# Patient Record
Sex: FEMALE | Race: OTHER | HISPANIC OR LATINO | URBAN - METROPOLITAN AREA
[De-identification: names, ages, dates, MRNs, and addresses within clinical notes are randomized per-mention and may not be internally consistent; named-entity substitution may affect disease eponyms.]

---

## 2019-03-08 ENCOUNTER — EMERGENCY (EMERGENCY)
Facility: HOSPITAL | Age: 69
LOS: 0 days | Discharge: HOME | End: 2019-03-08
Attending: EMERGENCY MEDICINE | Admitting: EMERGENCY MEDICINE

## 2019-03-08 VITALS
OXYGEN SATURATION: 98 % | TEMPERATURE: 98 F | RESPIRATION RATE: 18 BRPM | SYSTOLIC BLOOD PRESSURE: 156 MMHG | HEART RATE: 78 BPM | DIASTOLIC BLOOD PRESSURE: 92 MMHG

## 2019-03-08 DIAGNOSIS — M54.5 LOW BACK PAIN: ICD-10-CM
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
## 2019-03-08 DIAGNOSIS — E11.9 TYPE 2 DIABETES MELLITUS WITHOUT COMPLICATIONS: ICD-10-CM

## 2019-03-08 DIAGNOSIS — M54.9 DORSALGIA, UNSPECIFIED: ICD-10-CM

## 2019-03-08 DIAGNOSIS — I10 ESSENTIAL (PRIMARY) HYPERTENSION: ICD-10-CM

## 2019-03-08 RX ORDER — KETOROLAC TROMETHAMINE 30 MG/ML
30 SYRINGE (ML) INJECTION ONCE
Qty: 0 | Refills: 0 | Status: DISCONTINUED | OUTPATIENT
Start: 2019-03-08 | End: 2019-03-08

## 2019-03-08 RX ORDER — METHOCARBAMOL 500 MG/1
1000 TABLET, FILM COATED ORAL ONCE
Qty: 0 | Refills: 0 | Status: COMPLETED | OUTPATIENT
Start: 2019-03-08 | End: 2019-03-08

## 2019-03-08 RX ORDER — OXYCODONE AND ACETAMINOPHEN 5; 325 MG/1; MG/1
1 TABLET ORAL ONCE
Qty: 0 | Refills: 0 | Status: DISCONTINUED | OUTPATIENT
Start: 2019-03-08 | End: 2019-03-08

## 2019-03-08 RX ADMIN — METHOCARBAMOL 1000 MILLIGRAM(S): 500 TABLET, FILM COATED ORAL at 14:33

## 2019-03-08 RX ADMIN — Medication 30 MILLIGRAM(S): at 14:20

## 2019-03-08 NOTE — ED PROVIDER NOTE - ATTENDING CONTRIBUTION TO CARE
68 yo F with msk back pain, worse with movt and certain positions. remote hx of trauma about 1 month ago, no imaging in the past. no urinary/fecal incont. no abd pain. no fever. no vomiting.  vss, nontoxic, well appearing, in pain, pain exacerbated with movt, GCS 15, PERRL, EOMI, MMM, no cervical-thoracic- tenderness, + lumbar tendernss and paraspina tenderness lumbar area, neck supple, CTAB, no crepitus over chest wall, RRR, equal radial pulses bilat, abd soft/nt/nd, no fnd. FROMx4, no ecchymosis  imaging, pain mgt. reassess.

## 2019-03-08 NOTE — ED PROVIDER NOTE - NSFOLLOWUPCLINICS_GEN_ALL_ED_FT
[facility]  Rehabilitation  [address] [place] [address]  Phone: [phone]  Fax:   Follow Up Time:

## 2019-03-08 NOTE — ED PROVIDER NOTE - NSFOLLOWUPINSTRUCTIONS_ED_ALL_ED_FT
Dolor de espalda crónico  Chronic Back Pain  Cuando el dolor en la espalda dura más de 3 meses, se denomina dolor de espalda crónico. El dolor puede empeorar en ciertos momentos (exacerbaciones). Hay cosas que puede hacer en perez casa para manejar el dolor.    Siga estas indicaciones en perez casa:  Actividad     Evite agacharse y realizar otras actividades que agraven el dolor.  Cuando esté de pie:    Mantenga la parte tong de la espalda y el amish rectos.  Mantenga los hombros hacia atrás.  Evite encorvarse.    Cuando esté sentado:    Mantenga la espalda recta.  Relaje los hombros. No curve los hombros ni los eche hacia atrás.    No permanezca sentado o de pie en el mismo lugar judy mucho tiempo.  Judy el día, sparkle pausas breves para descansar. Por lo general, recostarse o permanecer de pie es mejor que permanecer sentado. Descansar puede ayudar a aliviar el dolor.  Cuando esté sentado o de pie por mucho tiempo, sparkle un poco de actividad moderada o ejercicios de estiramiento. [place] ayudará a evitar la rigidez y el dolor.  Realice actividad física con regularidad. Pregúntele al médico qué actividades son seguras para usted.  Image Image No levante ningún objeto que pese más de 10 libras (4,5 kg). Para evitar lesionarse cuando levanta objetos:    Flexione las rodillas.  Mantenga el peso cerca del cuerpo.  No gire el cuerpo.    Control del dolor     Si se lo indican, aplique hielo sobre la richie dolorida. El médico puede indicarle que use hielo judy las 24 a 48 horas luego del comienzo de ryley exacerbación.    Ponga el hielo en ryley bolsa plástica.  Coloque ryley toalla entre la piel y la bolsa de hielo.  Coloque el hielo judy 20 minutos, 2 a 3 veces por día.    Si se lo indican, aplique calor en la richie dolorida con la frecuencia que le indique el médico. Use la ray de calor que el médico le recomiende, juana ryley compresa de calor húmedo o ryley almohadilla térmica.    Coloque ryley toalla entre la piel y la ray de calor.  Aplique el calor judy 20 a 30 minutos.  Retire la ray de calor si la piel se pone de color hernandes brillante. [place] es muy importante si no puede sentir dolor, calor o frío. Puede correr un riesgo mayor de sufrir quemaduras.    Sumérjase en un baño cálido. [place] puede ayudar a aliviar el dolor.  [place] los medicamentos de venta ness y los recetados solamente juana se lo haya indicado el médico.  Instrucciones generales     Duerma sobre un colchón firme. Intente acostarse de costado, con las rodillas ligeramente flexionadas. Si se recuesta sobre la espalda, coloque ryley almohada debajo de las rodillas.  Concurra a todas las visitas de seguimiento juana se lo haya indicado el médico. [place] es importante.  Comuníquese con un médico si:  Tiene un dolor que no mejora con descanso ni medicamentos.  Solicite ayuda de inmediato si:  Siente debilidad en elena o ambos brazos o piernas.  Pierde la sensibilidad (adormecimiento) en elena o ambos brazos o piernas.  Le marcela controlar cuándo defecar (tener deposiciones) o hacer pis (orinar).  Siente malestar estomacal (náuseas).  Vomita.  Tiene dolor de vientre (abdominal).  Le falta el aire.  Pierde el conocimiento (se desmaya).  Resumen  Cuando el dolor en la espalda dura más de 3 meses, se denomina dolor de espalda crónico.   El dolor puede empeorar en ciertos momentos (exacerbaciones).  Use hielo y calor según le indique el médico. El médico puede indicarle que use hielo luego del comienzo de las exacerbaciones.  Esta información no tiene juana fin reemplazar el consejo del médico. Asegúrese de hacerle al médico cualquier pregunta que tenga.

## 2019-03-08 NOTE — ED ADULT NURSE REASSESSMENT NOTE - NS ED NURSE REASSESS COMMENT FT1
pt resting on stretcher, medicated as per MAR; CT results pending. Pt and family aware of plan of care.

## 2019-03-08 NOTE — ED PROVIDER NOTE - NS ED ROS FT
Constitutional: (-) fever  Eyes/ENT: (-) blurry vision, (-) epistaxis  Cardiovascular: (-) chest pain, (-) syncope  Respiratory: (-) cough, (-) shortness of breath  Gastrointestinal: (-) vomiting, (-) diarrhea  Musculoskeletal: (-) neck pain, +back pain, (-) joint pain  Integumentary: (-) rash, (-) edema  Neurological: (-) headache, (-) altered mental status  Psychiatric: (-) hallucinations  Allergic/Immunologic: (-) pruritus

## 2019-03-08 NOTE — ED PROVIDER NOTE - PHYSICAL EXAMINATION
VITAL SIGNS: I have reviewed nursing notes and confirm.  CONSTITUTIONAL: Well-developed; well-nourished; in no acute distress. uncomofortable lying still.   SKIN: skin exam is warm and dry, no acute rash.   HEAD: Normocephalic; atraumatic.  EYES:  EOM intact; conjunctiva and sclera clear.  ENT: No nasal discharge; airway clear. moist oral mucosa;  NECK: Supple; non tender.  CARD: S1, S2 normal; no murmurs, gallops, or rubs. Regular rate and rhythm. posterior tibial and radial pulses 2+  RESP: No wheezes, rales or rhonchi. cta b/l. no use of accessory muscles. no retractions  ABD: Normal bowel sounds; soft; non-distended; non-tender; no rebound. negative psoas, rovsign's and murphys.  EXT: Normal ROM. No  cyanosis or edema. 5/5 streght at hip b/l.  negative raise leg test.   BACK: No cva tenderness. no midline tenderness. tenderness to lumbosacral paravertebral.   LYMPH: No acute cervical adenopathy.  NEURO: Alert, oriented, grossly unremarkable.  sensory grossly intact.   PSYCH: Cooperative, appropriate.

## 2019-03-08 NOTE — ED PROVIDER NOTE - OBJECTIVE STATEMENT
70y/o F w/ hx of DM, HTN< osteoporosis.  fell 1 month from a flight of stairs, no trauma to head or loc.  pt was well after fall.  2-3 days ago pt started having back pain that started when she leaned forward.  went to urgent care yesterday gave her 2 IM shots and discharge home after she felt a little better. no numbness, no fecal or urinary incontinence or retention.  no fevers or chills. back pain feels 7/10 dull mildly mild shooting to legs b/l

## 2019-03-13 RX ORDER — OMEPRAZOLE 10 MG/1
1 CAPSULE, DELAYED RELEASE ORAL
Qty: 30 | Refills: 1 | OUTPATIENT
Start: 2019-03-13 | End: 2019-05-11

## 2019-03-15 ENCOUNTER — OUTPATIENT (OUTPATIENT)
Dept: OUTPATIENT SERVICES | Facility: HOSPITAL | Age: 69
LOS: 1 days | Discharge: HOME | End: 2019-03-15

## 2019-03-18 DIAGNOSIS — M81.0 AGE-RELATED OSTEOPOROSIS WITHOUT CURRENT PATHOLOGICAL FRACTURE: ICD-10-CM

## 2019-03-18 DIAGNOSIS — Z13.820 ENCOUNTER FOR SCREENING FOR OSTEOPOROSIS: ICD-10-CM

## 2019-03-18 DIAGNOSIS — Z87.310 PERSONAL HISTORY OF (HEALED) OSTEOPOROSIS FRACTURE: ICD-10-CM

## 2019-03-18 DIAGNOSIS — Z78.0 ASYMPTOMATIC MENOPAUSAL STATE: ICD-10-CM

## 2019-05-15 ENCOUNTER — OUTPATIENT (OUTPATIENT)
Dept: OUTPATIENT SERVICES | Facility: HOSPITAL | Age: 69
LOS: 1 days | Discharge: HOME | End: 2019-05-15

## 2019-05-16 DIAGNOSIS — Z13.21 ENCOUNTER FOR SCREENING FOR NUTRITIONAL DISORDER: ICD-10-CM

## 2019-05-16 DIAGNOSIS — R79.89 OTHER SPECIFIED ABNORMAL FINDINGS OF BLOOD CHEMISTRY: ICD-10-CM

## 2019-05-16 DIAGNOSIS — E11.9 TYPE 2 DIABETES MELLITUS WITHOUT COMPLICATIONS: ICD-10-CM

## 2019-05-16 DIAGNOSIS — R97.0 ELEVATED CARCINOEMBRYONIC ANTIGEN [CEA]: ICD-10-CM

## 2019-05-16 DIAGNOSIS — R79.1 ABNORMAL COAGULATION PROFILE: ICD-10-CM

## 2019-05-16 DIAGNOSIS — Z13.220 ENCOUNTER FOR SCREENING FOR LIPOID DISORDERS: ICD-10-CM

## 2019-05-16 DIAGNOSIS — Z00.00 ENCOUNTER FOR GENERAL ADULT MEDICAL EXAMINATION WITHOUT ABNORMAL FINDINGS: ICD-10-CM

## 2019-05-16 DIAGNOSIS — R94.6 ABNORMAL RESULTS OF THYROID FUNCTION STUDIES: ICD-10-CM

## 2019-05-16 DIAGNOSIS — Z13.0 ENCOUNTER FOR SCREENING FOR DISEASES OF THE BLOOD AND BLOOD-FORMING ORGANS AND CERTAIN DISORDERS INVOLVING THE IMMUNE MECHANISM: ICD-10-CM

## 2019-05-16 DIAGNOSIS — N39.0 URINARY TRACT INFECTION, SITE NOT SPECIFIED: ICD-10-CM

## 2019-05-16 DIAGNOSIS — R73.09 OTHER ABNORMAL GLUCOSE: ICD-10-CM
